# Patient Record
Sex: FEMALE | Race: WHITE | NOT HISPANIC OR LATINO | URBAN - METROPOLITAN AREA
[De-identification: names, ages, dates, MRNs, and addresses within clinical notes are randomized per-mention and may not be internally consistent; named-entity substitution may affect disease eponyms.]

---

## 2022-03-30 ENCOUNTER — ESTABLISHED PATIENT (OUTPATIENT)
Dept: URBAN - METROPOLITAN AREA CLINIC 21 | Facility: CLINIC | Age: 72
End: 2022-03-30

## 2022-03-30 DIAGNOSIS — H25.813: ICD-10-CM

## 2022-03-30 DIAGNOSIS — H04.123: ICD-10-CM

## 2022-03-30 DIAGNOSIS — H43.393: ICD-10-CM

## 2022-03-30 DIAGNOSIS — H52.4: ICD-10-CM

## 2022-03-30 PROCEDURE — 92015 DETERMINE REFRACTIVE STATE: CPT

## 2022-03-30 PROCEDURE — 99214 OFFICE O/P EST MOD 30 MIN: CPT

## 2022-03-30 ASSESSMENT — TONOMETRY
OD_IOP_MMHG: 16
OS_IOP_MMHG: 16

## 2022-03-30 ASSESSMENT — VISUAL ACUITY
OD_SC: 20/40-1
OS_SC: 20/30
OU_SC: 20/70

## 2022-05-11 ENCOUNTER — FOLLOW UP (OUTPATIENT)
Dept: URBAN - METROPOLITAN AREA CLINIC 21 | Facility: CLINIC | Age: 72
End: 2022-05-11

## 2022-05-11 DIAGNOSIS — H04.123: ICD-10-CM

## 2022-05-11 DIAGNOSIS — H43.393: ICD-10-CM

## 2022-05-11 DIAGNOSIS — H52.4: ICD-10-CM

## 2022-05-11 DIAGNOSIS — H25.813: ICD-10-CM

## 2022-05-11 PROCEDURE — 99211NC NO CHARGE VISIT

## 2022-05-11 ASSESSMENT — VISUAL ACUITY
OD_CC: 20/25
OU_CC: J3
OS_CC: 20/25

## 2022-05-11 NOTE — PATIENT DISCUSSION
Rx ck only today; no change to Rx. pt sees well DV/NV in phoropter, issue likely optical in nature, may need to change PAL design/remeasure. advised switching to bifocal or DVO/NVO, pt refuses.